# Patient Record
Sex: FEMALE | Race: WHITE | NOT HISPANIC OR LATINO | Employment: UNEMPLOYED | URBAN - METROPOLITAN AREA
[De-identification: names, ages, dates, MRNs, and addresses within clinical notes are randomized per-mention and may not be internally consistent; named-entity substitution may affect disease eponyms.]

---

## 2023-03-22 LAB
ANION GAP IN SER/PLAS: 13 MMOL/L (ref 10–20)
CALCIUM (MG/DL) IN SER/PLAS: 9.7 MG/DL (ref 8.6–10.3)
CARBON DIOXIDE, TOTAL (MMOL/L) IN SER/PLAS: 32 MMOL/L (ref 21–32)
CHLORIDE (MMOL/L) IN SER/PLAS: 99 MMOL/L (ref 98–107)
CREATININE (MG/DL) IN SER/PLAS: 0.8 MG/DL (ref 0.5–1.05)
ERYTHROCYTE DISTRIBUTION WIDTH (RATIO) BY AUTOMATED COUNT: 13.5 % (ref 11.5–14.5)
ERYTHROCYTE MEAN CORPUSCULAR HEMOGLOBIN CONCENTRATION (G/DL) BY AUTOMATED: 31.1 G/DL (ref 32–36)
ERYTHROCYTE MEAN CORPUSCULAR VOLUME (FL) BY AUTOMATED COUNT: 81 FL (ref 80–100)
ERYTHROCYTES (10*6/UL) IN BLOOD BY AUTOMATED COUNT: 5.26 X10E12/L (ref 4–5.2)
GFR FEMALE: 87 ML/MIN/1.73M2
GLUCOSE (MG/DL) IN SER/PLAS: 90 MG/DL (ref 74–99)
HEMATOCRIT (%) IN BLOOD BY AUTOMATED COUNT: 42.8 % (ref 36–46)
HEMOGLOBIN (G/DL) IN BLOOD: 13.3 G/DL (ref 12–16)
INR IN PPP BY COAGULATION ASSAY: 0.9 (ref 0.9–1.1)
LEUKOCYTES (10*3/UL) IN BLOOD BY AUTOMATED COUNT: 5.1 X10E9/L (ref 4.4–11.3)
PLATELETS (10*3/UL) IN BLOOD AUTOMATED COUNT: 332 X10E9/L (ref 150–450)
POTASSIUM (MMOL/L) IN SER/PLAS: 4.3 MMOL/L (ref 3.5–5.3)
PROTHROMBIN TIME (PT) IN PPP BY COAGULATION ASSAY: 10.5 SEC (ref 9.8–13.4)
SODIUM (MMOL/L) IN SER/PLAS: 140 MMOL/L (ref 136–145)
THYROTROPIN (MIU/L) IN SER/PLAS BY DETECTION LIMIT <= 0.05 MIU/L: 1.59 MIU/L (ref 0.44–3.98)
UREA NITROGEN (MG/DL) IN SER/PLAS: 10 MG/DL (ref 6–23)

## 2023-03-27 ENCOUNTER — HOSPITAL ENCOUNTER (OUTPATIENT)
Dept: DATA CONVERSION | Facility: HOSPITAL | Age: 58
End: 2023-03-27
Attending: INTERNAL MEDICINE | Admitting: INTERNAL MEDICINE
Payer: COMMERCIAL

## 2023-03-27 DIAGNOSIS — Z86.79 PERSONAL HISTORY OF OTHER DISEASES OF THE CIRCULATORY SYSTEM: ICD-10-CM

## 2023-03-27 DIAGNOSIS — R00.2 PALPITATIONS: ICD-10-CM

## 2023-03-28 LAB
ATRIAL RATE: 51 BPM
P AXIS: 61 DEGREES
P OFFSET: 205 MS
P ONSET: 148 MS
PR INTERVAL: 152 MS
Q ONSET: 224 MS
QRS COUNT: 9 BEATS
QRS DURATION: 96 MS
QT INTERVAL: 418 MS
QTC CALCULATION(BAZETT): 385 MS
QTC FREDERICIA: 396 MS
R AXIS: 1 DEGREES
T AXIS: -10 DEGREES
T OFFSET: 433 MS
VENTRICULAR RATE: 51 BPM

## 2023-09-07 VITALS — WEIGHT: 132.28 LBS | BODY MASS INDEX: 24.34 KG/M2 | HEIGHT: 62 IN

## 2023-10-26 ENCOUNTER — HOSPITAL ENCOUNTER (OUTPATIENT)
Dept: CARDIOLOGY | Facility: HOSPITAL | Age: 58
Discharge: HOME | End: 2023-10-26
Payer: COMMERCIAL

## 2023-10-26 DIAGNOSIS — R00.2 PALPITATIONS: ICD-10-CM

## 2023-10-26 DIAGNOSIS — Z95.818 PRESENCE OF OTHER CARDIAC IMPLANTS AND GRAFTS: Primary | ICD-10-CM

## 2023-10-26 DIAGNOSIS — Z95.818 PRESENCE OF OTHER CARDIAC IMPLANTS AND GRAFTS: ICD-10-CM

## 2023-10-26 PROCEDURE — 93298 REM INTERROG DEV EVAL SCRMS: CPT | Performed by: INTERNAL MEDICINE

## 2023-11-15 ENCOUNTER — OFFICE VISIT (OUTPATIENT)
Dept: CARDIOLOGY | Facility: CLINIC | Age: 58
End: 2023-11-15
Payer: COMMERCIAL

## 2023-11-15 ENCOUNTER — HOSPITAL ENCOUNTER (OUTPATIENT)
Dept: CARDIOLOGY | Facility: HOSPITAL | Age: 58
Discharge: HOME | End: 2023-11-15
Payer: COMMERCIAL

## 2023-11-15 VITALS
WEIGHT: 134 LBS | DIASTOLIC BLOOD PRESSURE: 62 MMHG | HEART RATE: 57 BPM | SYSTOLIC BLOOD PRESSURE: 118 MMHG | HEIGHT: 62 IN | BODY MASS INDEX: 24.66 KG/M2

## 2023-11-15 DIAGNOSIS — Z95.818 IMPLANTABLE LOOP RECORDER PRESENT: Primary | ICD-10-CM

## 2023-11-15 DIAGNOSIS — R00.2 PALPITATIONS: ICD-10-CM

## 2023-11-15 DIAGNOSIS — Z95.818 PRESENCE OF OTHER CARDIAC IMPLANTS AND GRAFTS: ICD-10-CM

## 2023-11-15 PROCEDURE — 1036F TOBACCO NON-USER: CPT | Performed by: NURSE PRACTITIONER

## 2023-11-15 PROCEDURE — 93291 INTERROG DEV EVAL SCRMS IP: CPT | Performed by: INTERNAL MEDICINE

## 2023-11-15 PROCEDURE — 93291 INTERROG DEV EVAL SCRMS IP: CPT

## 2023-11-15 PROCEDURE — 99213 OFFICE O/P EST LOW 20 MIN: CPT | Performed by: NURSE PRACTITIONER

## 2023-11-15 RX ORDER — CALCIUM CARBONATE 300MG(750)
400 TABLET,CHEWABLE ORAL DAILY
COMMUNITY

## 2023-11-15 NOTE — PROGRESS NOTES
"dCARDIOLOGY OFFICE VISIT      CHIEF COMPLAINT  Chief Complaint   Patient presents with    Device Check     Chief complaint: \"My hearts been doing fine.\"  HISTORY OF PRESENT ILLNESS  HPI  The patient is a 57-year-old  female who presents to the office today for follow-up evaluation of palpitations.  She underwent implantation of a loop recorder on March 27, 2023.  She reports that she has pressed the activated button a couple of times since last office visit.  She notes that at times her heart rate is slightly faster.  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  Patient did undergo a liver biopsy for evaluation of fatty liver with history of liver cysts.  During that procedure the common bile duct was punctured and the patient experienced bile leak into the peritoneum.  Patient was transported to [facility] and underwent repair.  Patient states that she has recovered from that procedure.  She denies chest pain, dizziness, lightheadedness, shortness of breath, abdominal distention, or lower extremity edema.  Past Medical History  Past Medical History:   Diagnosis Date    Encounter for full-term uncomplicated delivery     Normal vaginal delivery    Other conditions influencing health status     Menstruation       Social History  Social History     Tobacco Use    Smoking status: Never    Smokeless tobacco: Never   Substance Use Topics    Alcohol use: Not Currently    Drug use: Not Currently       Family History     Family History   Problem Relation Name Age of Onset    Hypertension Mother      Other (cardiac disorder) Mother      Heart failure Mother      Other (Other) Father          Allergies:  Allergies   Allergen Reactions    Red Dye Shortness of breath     Other Reaction(s): breathing    Aspartame Headache, Unknown and Other     Other Reaction(s): Intolerance    " "Cetirizine Unknown and Other     Other Reaction(s): Intolerance      \"makes heart race\"    \"makes heart race\"   Other Reaction(s): Intolerance      \"makes heart race\"    \"makes heart race\"    Gluten Protein Unknown and Other     Other Reaction(s): Intolerance    Milk Containing Products (Dairy) Unknown    Tetracyclines Unknown    Acetaminophen Rash and Unknown     rash    Cranberry Rash    Gadolinium-Containing Contrast Media Rash        Outpatient Medications:  Current Outpatient Medications   Medication Instructions    magnesium oxide (MAG-OX) 400 mg, oral, Daily          REVIEW OF SYSTEMS  Review of Systems   All other systems reviewed and are negative.        VITALS  Vitals:    11/15/23 0904   BP: 118/62   Pulse: 57       PHYSICAL EXAM  Vitals and nursing note reviewed.   Constitutional:       Appearance: Normal appearance.   HENT:      Head: Normocephalic.   Neck:      Vascular: No JVD.   Cardiovascular:      Rate and Rhythm: Normal rate and regular rhythm.      Pulses: Normal pulses.      Heart sounds: Normal heart sounds.   Pulmonary:      Effort: Pulmonary effort is normal.      Breath sounds: Normal breath sounds.   Abdominal:      General: Bowel sounds are normal.      Palpations: Abdomen is soft.   Musculoskeletal:         General: Normal range of motion.      Cervical back: Normal range of motion.   Skin:     General: Skin is warm and dry.  Left parasternal loop recorder pocket is well-healed without redness swelling or drainage.  Neurological:      General: No focal deficit present.      Mental Status: She is alert and oriented to person, place, and time.      Motor: Motor function is intact.   Psychiatric:         Attention and Perception: Attention and perception normal.         Mood and Affect: Mood and affect normal.         Speech: Speech normal.         Behavior: Behavior normal. Behavior is cooperative.         Thought Content: Thought content normal.         Cognition and Memory: Cognition and " memory normal.     Labs and testing: Twelve-lead EKG reveals sinus bradycardia 57 bpm.  QRS durations 92 ms,  ms, QTc 381 ms.  No acute ischemic changes or infarct patterns are noted.  Loop recorder interrogation dated November 15, 2023 revealed no arrhythmic events with no evidence of tachycardia, bradycardia, sinus pauses, AT/AF, or patient initiated symptoms since the last remote dated October 26, 2023.      ASSESSMENT AND PLAN    Clinical impressions:  1.  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  2.  Loop recorder implant ([employer] Linq 2) on March 27, 2023 for correlation of arrhythmia to symptom.  3.  Rheumatoid arthritis.  4.  Systemic lupus.  5.  Fatty liver disease with history of cysts status post bile leak following liver biopsy requiring repair in July, 2023.    Recommendations:  1.  Lifestyle modifications were reviewed with the patient.  She was encouraged to continue increased water intake at 64 ounces per day and refrain from caffeine consumption.  Patient reports that she does not consume any forms of caffeine.  She also reports that she eats clean, preparing most of her meals from scratch and eating organic.  2.  Obtain loop recorder downloads as scheduled.  3.  Follow-up in office with Dr. Helm in 6 months or sooner if needed.  The patient will undergo an in clinic loop recorder check prior to that office visit.    Evaluation and note by Mercedse Griffiths CNP  **Please excuse any errors in grammar or translation related to this dictation.  Voice recognition software was utilized to prepare this document.**

## 2023-12-01 ENCOUNTER — HOSPITAL ENCOUNTER (OUTPATIENT)
Dept: CARDIOLOGY | Facility: HOSPITAL | Age: 58
Discharge: HOME | End: 2023-12-01
Payer: COMMERCIAL

## 2023-12-01 DIAGNOSIS — Z95.818 PRESENCE OF OTHER CARDIAC IMPLANTS AND GRAFTS: ICD-10-CM

## 2023-12-01 DIAGNOSIS — R00.2 PALPITATIONS: ICD-10-CM

## 2023-12-01 PROCEDURE — 93298 REM INTERROG DEV EVAL SCRMS: CPT | Performed by: INTERNAL MEDICINE

## 2024-01-05 ENCOUNTER — HOSPITAL ENCOUNTER (OUTPATIENT)
Dept: CARDIOLOGY | Facility: HOSPITAL | Age: 59
Discharge: HOME | End: 2024-01-05
Payer: COMMERCIAL

## 2024-01-05 DIAGNOSIS — Z95.818 PRESENCE OF OTHER CARDIAC IMPLANTS AND GRAFTS: ICD-10-CM

## 2024-01-05 DIAGNOSIS — R00.2 PALPITATIONS: ICD-10-CM

## 2024-01-05 PROCEDURE — 93298 REM INTERROG DEV EVAL SCRMS: CPT | Performed by: INTERNAL MEDICINE

## 2024-01-05 PROCEDURE — 93297 REM INTERROG DEV EVAL ICPMS: CPT

## 2024-02-09 ENCOUNTER — HOSPITAL ENCOUNTER (OUTPATIENT)
Dept: CARDIOLOGY | Facility: HOSPITAL | Age: 59
Discharge: HOME | End: 2024-02-09
Payer: COMMERCIAL

## 2024-02-09 DIAGNOSIS — Z95.818 PRESENCE OF OTHER CARDIAC IMPLANTS AND GRAFTS: ICD-10-CM

## 2024-02-09 DIAGNOSIS — R00.2 PALPITATIONS: ICD-10-CM

## 2024-02-09 PROCEDURE — 93298 REM INTERROG DEV EVAL SCRMS: CPT | Performed by: INTERNAL MEDICINE

## 2024-02-09 PROCEDURE — 93297 REM INTERROG DEV EVAL ICPMS: CPT

## 2024-03-15 ENCOUNTER — HOSPITAL ENCOUNTER (OUTPATIENT)
Dept: CARDIOLOGY | Facility: HOSPITAL | Age: 59
Discharge: HOME | End: 2024-03-15
Payer: COMMERCIAL

## 2024-03-15 DIAGNOSIS — Z95.818 PRESENCE OF OTHER CARDIAC IMPLANTS AND GRAFTS: ICD-10-CM

## 2024-03-15 DIAGNOSIS — R00.2 PALPITATIONS: ICD-10-CM

## 2024-03-15 PROCEDURE — 93298 REM INTERROG DEV EVAL SCRMS: CPT | Performed by: INTERNAL MEDICINE

## 2024-03-15 PROCEDURE — 93298 REM INTERROG DEV EVAL SCRMS: CPT

## 2024-04-19 ENCOUNTER — HOSPITAL ENCOUNTER (OUTPATIENT)
Dept: CARDIOLOGY | Facility: HOSPITAL | Age: 59
Discharge: HOME | End: 2024-04-19
Payer: COMMERCIAL

## 2024-04-19 DIAGNOSIS — R00.2 PALPITATIONS: ICD-10-CM

## 2024-04-19 DIAGNOSIS — Z95.818 PRESENCE OF OTHER CARDIAC IMPLANTS AND GRAFTS: ICD-10-CM

## 2024-04-19 PROCEDURE — 93298 REM INTERROG DEV EVAL SCRMS: CPT | Performed by: INTERNAL MEDICINE

## 2024-04-19 PROCEDURE — 93298 REM INTERROG DEV EVAL SCRMS: CPT

## 2024-05-08 ENCOUNTER — HOSPITAL ENCOUNTER (OUTPATIENT)
Dept: CARDIOLOGY | Facility: HOSPITAL | Age: 59
Discharge: HOME | End: 2024-05-08
Payer: COMMERCIAL

## 2024-05-08 ENCOUNTER — OFFICE VISIT (OUTPATIENT)
Dept: CARDIOLOGY | Facility: CLINIC | Age: 59
End: 2024-05-08
Payer: COMMERCIAL

## 2024-05-08 VITALS
HEART RATE: 56 BPM | BODY MASS INDEX: 26.13 KG/M2 | SYSTOLIC BLOOD PRESSURE: 100 MMHG | HEIGHT: 62 IN | DIASTOLIC BLOOD PRESSURE: 52 MMHG | WEIGHT: 142 LBS

## 2024-05-08 DIAGNOSIS — Z95.818 IMPLANTABLE LOOP RECORDER PRESENT: Primary | ICD-10-CM

## 2024-05-08 DIAGNOSIS — R00.2 PALPITATIONS: ICD-10-CM

## 2024-05-08 DIAGNOSIS — I47.10 PAROXYSMAL SUPRAVENTRICULAR TACHYCARDIA (CMS-HCC): ICD-10-CM

## 2024-05-08 DIAGNOSIS — Z95.818 IMPLANTABLE LOOP RECORDER PRESENT: ICD-10-CM

## 2024-05-08 PROBLEM — L93.0 LUPUS ERYTHEMATOSUS: Status: ACTIVE | Noted: 2024-05-08

## 2024-05-08 PROCEDURE — 93291 INTERROG DEV EVAL SCRMS IP: CPT

## 2024-05-08 PROCEDURE — 1036F TOBACCO NON-USER: CPT | Performed by: NURSE PRACTITIONER

## 2024-05-08 PROCEDURE — 93291 INTERROG DEV EVAL SCRMS IP: CPT | Performed by: INTERNAL MEDICINE

## 2024-05-08 PROCEDURE — 99213 OFFICE O/P EST LOW 20 MIN: CPT | Performed by: NURSE PRACTITIONER

## 2024-05-08 NOTE — PROGRESS NOTES
"CARDIOLOGY OFFICE VISIT      CHIEF COMPLAINT  Chief Complaint   Patient presents with    Rapid Heart Rate     Chief complaint: \"I am doing fine.\"  HISTORY OF PRESENT ILLNESS  HPI  History: The patient is a 58-year-old  female who is followed for tachyarrhythmic palpitations.  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  She underwent implantation of a loop recorder on March 27, 2023 for correlation of arrhythmia to symptom and presents the office today for follow-up evaluation.  She states that she is actually been doing fine.  She underwent a liver biopsy for evaluation of fatty liver with history of liver cyst.  During that procedure the common bile duct was punctured and the patient experienced bile leak into the peritoneum.  She underwent repair at [facility].  She underwent a repeat ERCP on November 8, 2023 and states the bile leak was resolved.  She states she has been doing fine.  She denies any episodes of palpitations throughout her ordeal with her GI issues.  She denies chest pain, palpitations, dizziness, lightheadedness, shortness of breath, abdominal distention, or lower extremity edema.  Past Medical History  Past Medical History:   Diagnosis Date    Encounter for full-term uncomplicated delivery ([facility])     Normal vaginal delivery    Other conditions influencing health status     Menstruation       Social History  Social History     Tobacco Use    Smoking status: Never    Smokeless tobacco: Never   Substance Use Topics    Alcohol use: Not Currently    Drug use: Not Currently       Family History     Family History   Problem Relation Name Age of Onset    Hypertension Mother      Other (cardiac disorder) Mother      Heart failure Mother      Other (Other) Father          Allergies:  Allergies   Allergen Reactions    Red Dye Shortness of breath     Other " "Reaction(s): breathing    Aspartame Headache, Unknown and Other     Other Reaction(s): Intolerance    Cetirizine Unknown and Other     Other Reaction(s): Intolerance      \"makes heart race\"    \"makes heart race\"   Other Reaction(s): Intolerance      \"makes heart race\"    \"makes heart race\"    Gluten Protein Unknown and Other     Other Reaction(s): Intolerance    Milk Containing Products (Dairy) Unknown    Tetracyclines Unknown    Acetaminophen Rash and Unknown     rash    Cranberry Rash    Gadolinium-Containing Contrast Media Rash        Outpatient Medications:  Current Outpatient Medications   Medication Instructions    magnesium oxide (MAG-OX) 400 mg, oral, Daily          REVIEW OF SYSTEMS  Review of Systems   All other systems reviewed and are negative.        VITALS  Vitals:    05/08/24 0930   BP: 100/52   Pulse: 56       PHYSICAL EXAM  Vitals and nursing note reviewed.   Constitutional:       Appearance: Normal appearance.   HENT:      Head: Normocephalic.   Neck:      Vascular: No JVD.   Cardiovascular:      Rate and Rhythm: Normal rate and regular rhythm.      Pulses: Normal pulses.      Heart sounds: Normal heart sounds.   Pulmonary:      Effort: Pulmonary effort is normal.      Breath sounds: Normal breath sounds.   Abdominal:      General: Bowel sounds are normal.      Palpations: Abdomen is soft.   Musculoskeletal:         General: Normal range of motion.      Cervical back: Normal range of motion.   Skin:     General: Skin is warm and dry.  Left parasternal loop recorder pocket is well-healed without redness swelling or drainage.  Neurological:      General: No focal deficit present.      Mental Status: She is alert and oriented to person, place, and time.      Motor: Motor function is intact.   Psychiatric:         Attention and Perception: Attention and perception normal.         Mood and Affect: Mood and affect normal.         Speech: Speech normal.         Behavior: Behavior normal. Behavior is " cooperative.         Thought Content: Thought content normal.         Cognition and Memory: Cognition and memory normal.     Labs and testing: Twelve-lead EKG reveals sinus bradycardia 56 bpm.  QRS durations 86 ms,  ms, QTc 409 ms.  No acute ischemic changes or infarct patterns are noted.  Loop recorder interrogation dated May 8, 2024 reveals no arrhythmic events.  Estimated battery longevity is good.      ASSESSMENT AND PLAN    Clinical impressions:  1.  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  2.  Loop recorder implant ([employer] Linq 2) on March 27, 2023 for correlation of arrhythmia to symptom.  3.  Rheumatoid arthritis.  4.  Systemic lupus.  5.  Fatty liver disease with history of cysts status post bile leak following liver biopsy requiring repair in July, 2023..    Recommendations:  1.  Continue magnesium oxide as prescribed.  2.  Obtain remote loop recorder checks as scheduled.  The patient will undergo an in clinic loop recorder check prior to the office visit with Dr. Helm.  3.  Follow-up in office with Dr. Helm in 6 months or sooner if needed.  4.  Continue lifestyle modifications as discussed.    Evaluation and note by Mercedes Griffiths CNP  **Please excuse any errors in grammar or translation related to this dictation.  Voice recognition software was utilized to prepare this document.**

## 2024-05-15 ENCOUNTER — APPOINTMENT (OUTPATIENT)
Dept: CARDIOLOGY | Facility: CLINIC | Age: 59
End: 2024-05-15
Payer: COMMERCIAL

## 2024-05-15 ENCOUNTER — APPOINTMENT (OUTPATIENT)
Dept: CARDIOLOGY | Facility: HOSPITAL | Age: 59
End: 2024-05-15
Payer: COMMERCIAL

## 2024-06-07 ENCOUNTER — HOSPITAL ENCOUNTER (OUTPATIENT)
Dept: CARDIOLOGY | Facility: HOSPITAL | Age: 59
Discharge: HOME | End: 2024-06-07
Payer: COMMERCIAL

## 2024-06-07 DIAGNOSIS — R00.2 PALPITATIONS: ICD-10-CM

## 2024-06-07 DIAGNOSIS — Z95.818 PRESENCE OF OTHER CARDIAC IMPLANTS AND GRAFTS: ICD-10-CM

## 2024-06-13 ENCOUNTER — HOSPITAL ENCOUNTER (OUTPATIENT)
Dept: CARDIOLOGY | Facility: HOSPITAL | Age: 59
Discharge: HOME | End: 2024-06-13
Payer: COMMERCIAL

## 2024-06-13 DIAGNOSIS — R00.2 PALPITATIONS: ICD-10-CM

## 2024-06-13 DIAGNOSIS — Z95.818 PRESENCE OF OTHER CARDIAC IMPLANTS AND GRAFTS: ICD-10-CM

## 2024-06-13 PROCEDURE — 93298 REM INTERROG DEV EVAL SCRMS: CPT | Performed by: INTERNAL MEDICINE

## 2024-06-13 PROCEDURE — 93298 REM INTERROG DEV EVAL SCRMS: CPT

## 2024-07-19 ENCOUNTER — HOSPITAL ENCOUNTER (OUTPATIENT)
Dept: CARDIOLOGY | Facility: HOSPITAL | Age: 59
Discharge: HOME | End: 2024-07-19
Payer: COMMERCIAL

## 2024-07-19 DIAGNOSIS — Z95.818 PRESENCE OF OTHER CARDIAC IMPLANTS AND GRAFTS: ICD-10-CM

## 2024-07-19 DIAGNOSIS — R00.2 PALPITATIONS: ICD-10-CM

## 2024-07-19 PROCEDURE — 93298 REM INTERROG DEV EVAL SCRMS: CPT

## 2024-08-02 ENCOUNTER — HOSPITAL ENCOUNTER (OUTPATIENT)
Dept: CARDIOLOGY | Facility: HOSPITAL | Age: 59
Discharge: HOME | End: 2024-08-02
Payer: COMMERCIAL

## 2024-08-02 DIAGNOSIS — Z95.818 PRESENCE OF OTHER CARDIAC IMPLANTS AND GRAFTS: ICD-10-CM

## 2024-08-02 DIAGNOSIS — R00.2 PALPITATIONS: ICD-10-CM

## 2024-08-16 ENCOUNTER — HOSPITAL ENCOUNTER (OUTPATIENT)
Dept: CARDIOLOGY | Facility: HOSPITAL | Age: 59
Discharge: HOME | End: 2024-08-16
Payer: COMMERCIAL

## 2024-08-16 DIAGNOSIS — Z95.818 PRESENCE OF OTHER CARDIAC IMPLANTS AND GRAFTS: ICD-10-CM

## 2024-08-16 DIAGNOSIS — R00.2 PALPITATIONS: ICD-10-CM

## 2024-08-23 ENCOUNTER — HOSPITAL ENCOUNTER (OUTPATIENT)
Dept: CARDIOLOGY | Facility: HOSPITAL | Age: 59
Discharge: HOME | End: 2024-08-23
Payer: COMMERCIAL

## 2024-08-23 DIAGNOSIS — R00.2 PALPITATIONS: ICD-10-CM

## 2024-08-23 DIAGNOSIS — Z95.818 PRESENCE OF OTHER CARDIAC IMPLANTS AND GRAFTS: ICD-10-CM

## 2024-08-23 PROCEDURE — 93298 REM INTERROG DEV EVAL SCRMS: CPT

## 2024-09-27 ENCOUNTER — HOSPITAL ENCOUNTER (OUTPATIENT)
Dept: CARDIOLOGY | Facility: HOSPITAL | Age: 59
Discharge: HOME | End: 2024-09-27
Payer: COMMERCIAL

## 2024-09-27 DIAGNOSIS — R00.2 PALPITATIONS: ICD-10-CM

## 2024-09-27 DIAGNOSIS — Z95.818 PRESENCE OF OTHER CARDIAC IMPLANTS AND GRAFTS: ICD-10-CM

## 2024-09-27 PROCEDURE — 93298 REM INTERROG DEV EVAL SCRMS: CPT

## 2024-09-27 PROCEDURE — 93298 REM INTERROG DEV EVAL SCRMS: CPT | Performed by: INTERNAL MEDICINE

## 2024-11-01 ENCOUNTER — HOSPITAL ENCOUNTER (OUTPATIENT)
Dept: CARDIOLOGY | Facility: HOSPITAL | Age: 59
Discharge: HOME | End: 2024-11-01
Payer: COMMERCIAL

## 2024-11-01 DIAGNOSIS — R00.2 PALPITATIONS: ICD-10-CM

## 2024-11-01 DIAGNOSIS — Z95.818 PRESENCE OF OTHER CARDIAC IMPLANTS AND GRAFTS: ICD-10-CM

## 2024-11-01 DIAGNOSIS — Z95.818 PRESENCE OF OTHER CARDIAC IMPLANTS AND GRAFTS: Primary | ICD-10-CM

## 2024-11-01 PROCEDURE — 93298 REM INTERROG DEV EVAL SCRMS: CPT | Performed by: INTERNAL MEDICINE

## 2024-11-01 PROCEDURE — 93298 REM INTERROG DEV EVAL SCRMS: CPT

## 2024-11-27 ENCOUNTER — APPOINTMENT (OUTPATIENT)
Dept: CARDIOLOGY | Facility: CLINIC | Age: 59
End: 2024-11-27
Payer: COMMERCIAL

## 2024-11-27 ENCOUNTER — HOSPITAL ENCOUNTER (OUTPATIENT)
Dept: CARDIOLOGY | Facility: HOSPITAL | Age: 59
Discharge: HOME | End: 2024-11-27
Payer: COMMERCIAL

## 2024-11-27 VITALS
BODY MASS INDEX: 27.79 KG/M2 | WEIGHT: 151 LBS | HEIGHT: 62 IN | HEART RATE: 60 BPM | DIASTOLIC BLOOD PRESSURE: 80 MMHG | SYSTOLIC BLOOD PRESSURE: 110 MMHG

## 2024-11-27 DIAGNOSIS — R94.31 ABNORMAL EKG: ICD-10-CM

## 2024-11-27 DIAGNOSIS — Z95.818 IMPLANTABLE LOOP RECORDER PRESENT: Primary | ICD-10-CM

## 2024-11-27 DIAGNOSIS — R00.2 PALPITATIONS: ICD-10-CM

## 2024-11-27 DIAGNOSIS — Z95.818 IMPLANTABLE LOOP RECORDER PRESENT: ICD-10-CM

## 2024-11-27 DIAGNOSIS — I47.10 PAROXYSMAL SUPRAVENTRICULAR TACHYCARDIA (CMS-HCC): ICD-10-CM

## 2024-11-27 DIAGNOSIS — Z78.9 NEVER SMOKED TOBACCO: ICD-10-CM

## 2024-11-27 PROCEDURE — 99215 OFFICE O/P EST HI 40 MIN: CPT | Performed by: INTERNAL MEDICINE

## 2024-11-27 PROCEDURE — 93291 INTERROG DEV EVAL SCRMS IP: CPT

## 2024-11-27 PROCEDURE — 1036F TOBACCO NON-USER: CPT | Performed by: INTERNAL MEDICINE

## 2024-11-27 PROCEDURE — 93291 INTERROG DEV EVAL SCRMS IP: CPT | Performed by: INTERNAL MEDICINE

## 2024-11-27 PROCEDURE — 3008F BODY MASS INDEX DOCD: CPT | Performed by: INTERNAL MEDICINE

## 2024-11-27 PROCEDURE — 93000 ELECTROCARDIOGRAM COMPLETE: CPT | Mod: DISTINCT PROCEDURAL SERVICE | Performed by: INTERNAL MEDICINE

## 2024-11-27 ASSESSMENT — ENCOUNTER SYMPTOMS
CONSTITUTIONAL NEGATIVE: 1
CARDIOVASCULAR NEGATIVE: 1
NEUROLOGICAL NEGATIVE: 1
RESPIRATORY NEGATIVE: 1

## 2024-11-27 NOTE — PROGRESS NOTES
CARDIOLOGY OFFICE VISIT      CHIEF COMPLAINT  Chief Complaint   Patient presents with    Follow-up     Pt is here today following up after 6 months with device check        HISTORY OF PRESENT ILLNESS  HPI  58-year-old  female who is followed for tachyarrhythmic palpitations. 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 She underwent implantation of a loop recorder on March 27, 2023 for correlation of arrhythmia to symptom and presents the office today for follow-up evaluation. She states that she is actually been doing fine. She underwent a liver biopsy for evaluation of fatty liver with history of liver cyst. During that procedure the common bile duct was punctured and the patient experienced bile leak into the peritoneum. She underwent repair at [facility]. She underwent a repeat ERCP on November 8, 2023 and states the bile leak was resolved.    Patient states lately she has been doing well.  She recalls having some episodes of palpitations around December 2024.  Loop recorder interrogation was reviewed during this office visit.  Patient had a couple of episodes of palpitations and was first on August 9, 2024.  Looking at the rhythm strips that were consistent with brief episodes of SVT possibility of AVNRT.  Only short duration.    EKG performed today shows sinus rhythm at rate of 60 bpm QRS duration 90 ms QT corrected 400 ms.  Rhythm strip shows the same pattern.      Past Medical History  Past Medical History:   Diagnosis Date    Encounter for full-term uncomplicated delivery     Normal vaginal delivery    Other conditions influencing health status     Menstruation       Social History  Social History     Tobacco Use    Smoking status: Never    Smokeless tobacco: Never   Substance Use Topics    Alcohol use: Not Currently    Drug use: Not Currently       Family History    "  Family History   Problem Relation Name Age of Onset    Hypertension Mother      Other (cardiac disorder) Mother      Heart failure Mother      Other (Other) Father          Allergies:  Allergies   Allergen Reactions    Red Dye Shortness of breath     Other Reaction(s): breathing    Aspartame Headache, Unknown and Other     Other Reaction(s): Intolerance    Cetirizine Unknown and Other     Other Reaction(s): Intolerance      \"makes heart race\"    \"makes heart race\"   Other Reaction(s): Intolerance      \"makes heart race\"    \"makes heart race\"    Gluten Protein Unknown and Other     Other Reaction(s): Intolerance    Milk Containing Products (Dairy) Unknown    Tetracyclines Unknown    Acetaminophen Rash and Unknown     rash    Cranberry Rash    Gadolinium-Containing Contrast Media Rash        Outpatient Medications:  Current Outpatient Medications   Medication Instructions    magnesium oxide (MAG-OX) 400 mg, Daily          REVIEW OF SYSTEMS  Review of Systems   Constitutional: Negative.   Cardiovascular: Negative.    Respiratory: Negative.     Neurological: Negative.    All other systems reviewed and are negative.        VITALS  Vitals:    11/27/24 0943   BP: 110/80   Pulse: 60       PHYSICAL EXAM  Constitutional:       Appearance: Healthy appearance. Not in distress.   Eyes:      Conjunctiva/sclera: Conjunctivae normal.      Pupils: Pupils are equal, round, and reactive to light.   Neck:      Vascular: No JVR. JVD normal.   Pulmonary:      Effort: Pulmonary effort is normal.      Breath sounds: Normal breath sounds. No wheezing. No rhonchi. No rales.   Chest:      Chest wall: Not tender to palpatation.   Cardiovascular:      PMI at left midclavicular line. Normal rate. Regular rhythm. Normal S1. Normal S2.       Murmurs: There is no murmur.      No gallop.  No click. No rub.   Pulses:     Intact distal pulses.   Edema:     Peripheral edema absent.   Abdominal:      Tenderness: There is no abdominal tenderness. "   Musculoskeletal: Normal range of motion.         General: No tenderness.      Cervical back: Normal range of motion. Skin:     General: Skin is warm and dry.   Neurological:      General: No focal deficit present.      Mental Status: Alert and oriented to person, place and time.           ASSESSMENT AND PLAN    Clinical impressions:  1.  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  2.  Loop recorder implant ([employer] Linq 2) on March 27, 2023 for correlation of arrhythmia to symptom.  3.  Rheumatoid arthritis.  4.  Systemic lupus.  5.  Fatty liver disease with history of cysts status post bile leak following liver biopsy requiring repair in July, 2023..    Plan recommendations    I had a lengthy discussion with patient regarding findings of the device interrogation.  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  We discussed the option of observation versus electrophysiology study and ablation therapy.  Patient states that she would like to be in observation for now her symptoms are not too bad.  Will follow her in my office every 6 months or sooner needed.    Follow device clinic as scheduled.    Patient prefers not to use any AV node blocking agents due to episodes of hypotension.    Risk factor modification and lifestyle modification discussed with patient. Diet , exercise and hydration discussed with patient.'    I have personally review with patient during this office visit, laboratory data, echocardiogram results, stress test results, Holter-event monitor results prior and after the last electrophysiology visit. All questions has been answered.    Please excuse any errors in grammar or translation related to this dictation.  Voice recognition software was utilized to prepare this document.

## 2024-11-27 NOTE — PATIENT INSTRUCTIONS
6 month visit  Continue same medications and treatments.   Patient educated on proper medication use.   Patient educated on risk factor modification.   Please bring any lab results from other providers / physicians to your next appointment.     Please bring all medicines, vitamins, and herbal supplements with you when you come to the office.     Prescriptions will not be filled unless you are compliant with your follow up appointments or have a follow up appointment scheduled as per instruction of your physician. Refills should be requested at the time of your visit.  Avoid caffeine  Drink plenty of water.

## 2025-01-03 ENCOUNTER — HOSPITAL ENCOUNTER (OUTPATIENT)
Dept: CARDIOLOGY | Facility: HOSPITAL | Age: 60
Discharge: HOME | End: 2025-01-03
Payer: COMMERCIAL

## 2025-01-03 DIAGNOSIS — Z95.818 IMPLANTABLE LOOP RECORDER PRESENT: ICD-10-CM

## 2025-01-03 DIAGNOSIS — R00.2 PALPITATIONS: ICD-10-CM

## 2025-01-03 DIAGNOSIS — I47.10 PAROXYSMAL SUPRAVENTRICULAR TACHYCARDIA (CMS-HCC): ICD-10-CM

## 2025-01-03 PROCEDURE — 93298 REM INTERROG DEV EVAL SCRMS: CPT | Performed by: INTERNAL MEDICINE

## 2025-01-03 PROCEDURE — 93298 REM INTERROG DEV EVAL SCRMS: CPT

## 2025-02-07 ENCOUNTER — HOSPITAL ENCOUNTER (OUTPATIENT)
Dept: CARDIOLOGY | Facility: HOSPITAL | Age: 60
Discharge: HOME | End: 2025-02-07
Payer: COMMERCIAL

## 2025-02-07 DIAGNOSIS — I47.10 PAROXYSMAL SUPRAVENTRICULAR TACHYCARDIA (CMS-HCC): ICD-10-CM

## 2025-02-07 DIAGNOSIS — Z95.818 IMPLANTABLE LOOP RECORDER PRESENT: ICD-10-CM

## 2025-02-07 DIAGNOSIS — R00.2 PALPITATIONS: ICD-10-CM

## 2025-02-07 PROCEDURE — 93298 REM INTERROG DEV EVAL SCRMS: CPT

## 2025-02-07 PROCEDURE — 93298 REM INTERROG DEV EVAL SCRMS: CPT | Performed by: INTERNAL MEDICINE

## 2025-02-17 ENCOUNTER — HOSPITAL ENCOUNTER (OUTPATIENT)
Dept: CARDIOLOGY | Facility: HOSPITAL | Age: 60
Discharge: HOME | End: 2025-02-17
Payer: COMMERCIAL

## 2025-02-17 DIAGNOSIS — I47.10 PAROXYSMAL SUPRAVENTRICULAR TACHYCARDIA (CMS-HCC): ICD-10-CM

## 2025-02-17 DIAGNOSIS — R00.2 PALPITATIONS: ICD-10-CM

## 2025-02-17 DIAGNOSIS — Z95.818 IMPLANTABLE LOOP RECORDER PRESENT: ICD-10-CM

## 2025-03-18 ENCOUNTER — HOSPITAL ENCOUNTER (OUTPATIENT)
Dept: CARDIOLOGY | Facility: HOSPITAL | Age: 60
Discharge: HOME | End: 2025-03-18
Payer: COMMERCIAL

## 2025-03-18 DIAGNOSIS — I47.10 PAROXYSMAL SUPRAVENTRICULAR TACHYCARDIA (CMS-HCC): ICD-10-CM

## 2025-03-18 DIAGNOSIS — Z95.818 IMPLANTABLE LOOP RECORDER PRESENT: ICD-10-CM

## 2025-03-18 DIAGNOSIS — R00.2 PALPITATIONS: ICD-10-CM

## 2025-03-18 PROCEDURE — 93298 REM INTERROG DEV EVAL SCRMS: CPT | Performed by: INTERNAL MEDICINE

## 2025-03-18 PROCEDURE — 93298 REM INTERROG DEV EVAL SCRMS: CPT

## 2025-03-20 ENCOUNTER — APPOINTMENT (OUTPATIENT)
Dept: CARDIOLOGY | Facility: CLINIC | Age: 60
End: 2025-03-20
Payer: COMMERCIAL

## 2025-03-20 VITALS
HEIGHT: 62 IN | BODY MASS INDEX: 28.6 KG/M2 | HEART RATE: 68 BPM | DIASTOLIC BLOOD PRESSURE: 76 MMHG | WEIGHT: 155.4 LBS | SYSTOLIC BLOOD PRESSURE: 136 MMHG

## 2025-03-20 DIAGNOSIS — Z95.818 IMPLANTABLE LOOP RECORDER PRESENT: Primary | ICD-10-CM

## 2025-03-20 DIAGNOSIS — I47.10 PAROXYSMAL SUPRAVENTRICULAR TACHYCARDIA (CMS-HCC): ICD-10-CM

## 2025-03-20 DIAGNOSIS — R00.2 PALPITATIONS: ICD-10-CM

## 2025-03-20 DIAGNOSIS — Z78.9 NEVER SMOKED TOBACCO: ICD-10-CM

## 2025-03-20 DIAGNOSIS — R94.31 ABNORMAL EKG: ICD-10-CM

## 2025-03-20 PROCEDURE — 99215 OFFICE O/P EST HI 40 MIN: CPT | Performed by: INTERNAL MEDICINE

## 2025-03-20 PROCEDURE — 1036F TOBACCO NON-USER: CPT | Performed by: INTERNAL MEDICINE

## 2025-03-20 PROCEDURE — 93000 ELECTROCARDIOGRAM COMPLETE: CPT | Performed by: INTERNAL MEDICINE

## 2025-03-20 PROCEDURE — 3008F BODY MASS INDEX DOCD: CPT | Performed by: INTERNAL MEDICINE

## 2025-03-20 ASSESSMENT — ENCOUNTER SYMPTOMS
DYSPNEA ON EXERTION: 0
PALPITATIONS: 1

## 2025-03-20 NOTE — PROGRESS NOTES
CARDIOLOGY OFFICE VISIT      CHIEF COMPLAINT  Chief Complaint   Patient presents with    Follow-up     Pt is here today following up to discuss abnormal device check        HISTORY OF PRESENT ILLNESS  HPI  59-year-old  female who is followed for tachyarrhythmic palpitations. 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 She underwent implantation of a loop recorder on March 27, 2023 for correlation of arrhythmia to symptom and presents the office today for follow-up evaluation. She states that she is actually been doing fine. She underwent a liver biopsy for evaluation of fatty liver with history of liver cyst. During that procedure the common bile duct was punctured and the patient experienced bile leak into the peritoneum. She underwent repair at [facility]. She underwent a repeat ERCP on November 8, 2023 and states the bile leak was resolved.     She recalls having some episodes of palpitations around December 2024. Loop recorder interrogation was reviewed during this office visit. Patient had a couple of episodes of palpitations and was first on August 9, 2024. Looking at the rhythm strips that were consistent with brief episodes of SVT possibility of AVNRT. Only short duration.     Patient states that she continues having episodes of palpitations.  She had 1 episode of palpitations lasting for approximately 5 minutes at a heart rate of 150 bpm by loop recorder interrogation February 13, 2025.  That happened around midnight.    EKG performed today shows sinus rhythm at a rate of 68 bpm QRS ration 96 ms QT corrected 448 ms.  Rhythm strip shows the same pattern.          Past Medical History  Past Medical History:   Diagnosis Date    Encounter for full-term uncomplicated delivery     Normal vaginal delivery    Other conditions influencing health status     Menstruation  "      Social History  Social History     Tobacco Use    Smoking status: Never    Smokeless tobacco: Never   Substance Use Topics    Alcohol use: Not Currently    Drug use: Not Currently       Family History     Family History   Problem Relation Name Age of Onset    Hypertension Mother      Other (cardiac disorder) Mother      Heart failure Mother      Other (Other) Father          Allergies:  Allergies   Allergen Reactions    Red Dye Shortness of breath     Other Reaction(s): breathing    Aspartame Headache, Unknown and Other     Other Reaction(s): Intolerance    Cetirizine Unknown and Other     Other Reaction(s): Intolerance      \"makes heart race\"    \"makes heart race\"   Other Reaction(s): Intolerance      \"makes heart race\"    \"makes heart race\"    Gluten Protein Unknown and Other     Other Reaction(s): Intolerance    Milk Containing Products (Dairy) Unknown    Tetracyclines Unknown    Acetaminophen Rash and Unknown     rash    Cranberry Rash    Gadolinium-Containing Contrast Media Rash        Outpatient Medications:  Current Outpatient Medications   Medication Instructions    magnesium oxide (MAG-OX) 400 mg, Daily          REVIEW OF SYSTEMS  Review of Systems   Cardiovascular:  Positive for palpitations. Negative for chest pain and dyspnea on exertion.   All other systems reviewed and are negative.        VITALS  Vitals:    03/20/25 1329   BP: 136/76   Pulse: 68       PHYSICAL EXAM  Constitutional:       General: Awake.      Appearance: Normal and healthy appearance. Well-developed and not in distress.   Neck:      Vascular: No JVR. JVD normal.   Pulmonary:      Effort: Pulmonary effort is normal.      Breath sounds: Normal breath sounds. No wheezing. No rhonchi. No rales.   Chest:      Chest wall: Not tender to palpatation.      Comments: Loop recorder in place   Cardiovascular:      PMI at left midclavicular line. Normal rate. Regular rhythm. Normal S1. Normal S2.       Murmurs: There is no murmur.      No " gallop.  No click. No rub.   Pulses:     Intact distal pulses.   Edema:     Peripheral edema absent.   Abdominal:      Tenderness: There is no abdominal tenderness.   Musculoskeletal: Normal range of motion.         General: No tenderness. Skin:     General: Skin is warm and dry.   Neurological:      General: No focal deficit present.      Mental Status: Alert and oriented to person, place and time.           ASSESSMENT AND PLAN      Clinical impressions:  1.  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  2.  Loop recorder implant ([employer] Linq 2) on March 27, 2023 for correlation of arrhythmia to symptom.  3.  Rheumatoid arthritis.  4.  Systemic lupus.  5.  Fatty liver disease with history of cysts status post bile leak following liver biopsy requiring repair in July, 2023..     Plan-recommendations      Discussion with patient regarding findings of the loop recorder.  Patient still have palpitation associated with SVT.  We discussed the option of ablation therapy.  She states that she would like to think about this.  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  She may have recurrence of this arrhythmia could be a different arrhythmia.    Continue with current medical therapy.    Follow device clinic as scheduled.    Follow-up in my office in the next 3 to 6 months or sooner if needed.    Risk factor modification and lifestyle modification discussed with patient. Diet , exercise and hydration discussed with patient.    I have personally review with patient during this office visit, laboratory data, echocardiogram results, stress test results, Holter-event monitor results prior and after the last electrophysiology visit. All questions has been answered.    Please excuse any errors in grammar or translation related to this dictation.  Voice  recognition software was utilized to prepare this document.        I, Dr. Helm, personally performed the services described in the documentation as scribed by the nurse in my presence, and confirm it is both accurate and complete.

## 2025-03-20 NOTE — PATIENT INSTRUCTIONS
Continue same medications/treatment.  Patient educated on proper medication use.  Patient educated on risk factor modification.  Please bring any lab results from other providers/physicians to your next appointment.    Please bring all medicines, vitamins, and herbal supplements with you when you come to the office.    Prescriptions will not be filled unless you are compliant with your follow up appointments or have a follow up appointment scheduled as per instruction of your physician. Refills should be requested at the time of your visit.    Follow up with Dr. Smith as scheduled in June with device check   Continue monthly remote checks    Jen GRANT RN, AM SCRIBING FOR, AND IN THE PRESENCE OF DR. EVAN SMITH MD

## 2025-04-25 ENCOUNTER — HOSPITAL ENCOUNTER (OUTPATIENT)
Dept: CARDIOLOGY | Facility: HOSPITAL | Age: 60
Discharge: HOME | End: 2025-04-25
Payer: COMMERCIAL

## 2025-04-25 DIAGNOSIS — R00.2 PALPITATIONS: ICD-10-CM

## 2025-04-25 DIAGNOSIS — I47.10 PAROXYSMAL SUPRAVENTRICULAR TACHYCARDIA (CMS-HCC): ICD-10-CM

## 2025-04-25 DIAGNOSIS — Z95.818 IMPLANTABLE LOOP RECORDER PRESENT: ICD-10-CM

## 2025-04-25 PROCEDURE — 93298 REM INTERROG DEV EVAL SCRMS: CPT

## 2025-04-25 PROCEDURE — 93298 REM INTERROG DEV EVAL SCRMS: CPT | Performed by: INTERNAL MEDICINE

## 2025-05-30 ENCOUNTER — HOSPITAL ENCOUNTER (OUTPATIENT)
Dept: CARDIOLOGY | Facility: HOSPITAL | Age: 60
Discharge: HOME | End: 2025-05-30
Payer: COMMERCIAL

## 2025-05-30 DIAGNOSIS — R00.2 PALPITATIONS: ICD-10-CM

## 2025-05-30 DIAGNOSIS — Z95.818 IMPLANTABLE LOOP RECORDER PRESENT: ICD-10-CM

## 2025-05-30 DIAGNOSIS — I47.10 PAROXYSMAL SUPRAVENTRICULAR TACHYCARDIA: ICD-10-CM

## 2025-05-30 PROCEDURE — 93298 REM INTERROG DEV EVAL SCRMS: CPT | Performed by: INTERNAL MEDICINE

## 2025-05-30 PROCEDURE — 93298 REM INTERROG DEV EVAL SCRMS: CPT

## 2025-06-04 ENCOUNTER — APPOINTMENT (OUTPATIENT)
Dept: CARDIOLOGY | Facility: HOSPITAL | Age: 60
End: 2025-06-04
Payer: COMMERCIAL

## 2025-06-04 ENCOUNTER — APPOINTMENT (OUTPATIENT)
Dept: CARDIOLOGY | Facility: CLINIC | Age: 60
End: 2025-06-04
Payer: COMMERCIAL

## 2025-07-03 ENCOUNTER — HOSPITAL ENCOUNTER (OUTPATIENT)
Dept: CARDIOLOGY | Facility: HOSPITAL | Age: 60
Discharge: HOME | End: 2025-07-03
Payer: COMMERCIAL

## 2025-07-03 DIAGNOSIS — I47.10 PAROXYSMAL SUPRAVENTRICULAR TACHYCARDIA: ICD-10-CM

## 2025-07-03 DIAGNOSIS — Z95.818 IMPLANTABLE LOOP RECORDER PRESENT: ICD-10-CM

## 2025-07-03 DIAGNOSIS — R00.2 PALPITATIONS: ICD-10-CM

## 2025-07-03 PROCEDURE — 93298 REM INTERROG DEV EVAL SCRMS: CPT

## 2025-07-03 PROCEDURE — 93298 REM INTERROG DEV EVAL SCRMS: CPT | Performed by: INTERNAL MEDICINE

## 2025-07-09 ENCOUNTER — HOSPITAL ENCOUNTER (OUTPATIENT)
Dept: CARDIOLOGY | Facility: HOSPITAL | Age: 60
Discharge: HOME | End: 2025-07-09
Payer: COMMERCIAL

## 2025-07-09 ENCOUNTER — APPOINTMENT (OUTPATIENT)
Dept: CARDIOLOGY | Facility: CLINIC | Age: 60
End: 2025-07-09
Payer: COMMERCIAL

## 2025-07-09 VITALS
WEIGHT: 148 LBS | BODY MASS INDEX: 27.23 KG/M2 | HEIGHT: 62 IN | DIASTOLIC BLOOD PRESSURE: 68 MMHG | HEART RATE: 65 BPM | SYSTOLIC BLOOD PRESSURE: 126 MMHG

## 2025-07-09 DIAGNOSIS — I47.10 PAROXYSMAL SUPRAVENTRICULAR TACHYCARDIA: ICD-10-CM

## 2025-07-09 DIAGNOSIS — Z95.818 IMPLANTABLE LOOP RECORDER PRESENT: ICD-10-CM

## 2025-07-09 DIAGNOSIS — R00.2 PALPITATIONS: ICD-10-CM

## 2025-07-09 PROCEDURE — 93291 INTERROG DEV EVAL SCRMS IP: CPT

## 2025-07-09 PROCEDURE — 93000 ELECTROCARDIOGRAM COMPLETE: CPT | Mod: DISTINCT PROCEDURAL SERVICE | Performed by: INTERNAL MEDICINE

## 2025-07-09 PROCEDURE — 3008F BODY MASS INDEX DOCD: CPT | Performed by: PHYSICIAN ASSISTANT

## 2025-07-09 PROCEDURE — 93291 INTERROG DEV EVAL SCRMS IP: CPT | Performed by: INTERNAL MEDICINE

## 2025-07-09 PROCEDURE — 1036F TOBACCO NON-USER: CPT | Performed by: PHYSICIAN ASSISTANT

## 2025-07-09 PROCEDURE — 99214 OFFICE O/P EST MOD 30 MIN: CPT | Performed by: PHYSICIAN ASSISTANT

## 2025-07-09 RX ORDER — DILTIAZEM HYDROCHLORIDE 30 MG/1
30 TABLET, FILM COATED ORAL 2 TIMES DAILY PRN
Qty: 30 TABLET | Refills: 3 | Status: SHIPPED | OUTPATIENT
Start: 2025-07-09 | End: 2026-07-09

## 2025-07-09 NOTE — PATIENT INSTRUCTIONS
Great to see you today.   Please take your medications and or do life style behavior modifications as discussed.   Please make appointment in 6 months with Dr Helm  You were prescribed immediate release diltiazem. You can take this if your heart rate doesn't come down after 5 mins or after vagal maneuvers.   Please call if you have any questions or concerns.  Please go to emergency department if you have abrupt onset of chest, shortness of breath, light headedness or dizziness.

## 2025-07-09 NOTE — PROGRESS NOTES
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 She will consider ablation if necessary.   Maintained on MgOx 400mg daily    HPI: 7/9/2025  59 year old female pmhx AVNRT s/p ablation (12/2010), Loop recorder implanted 3/2023  to correlate arrhythmia to symptoms/palpitations, SVT palpitations RA, SLE, Fatty liver. Maintained on MgOx 400mg daily.     She is here for loop recorder device checks.  Review of her event reports were unremarkable with the exception of summary between February 8 and March 18 that reports onset of SVT up to 150 bpm but the patient denied any dizziness or syncope.  She reports that the episode lasted about 5 minutes.  She does not take any medications other than her magnesium.    She is going to [place] this fall and since she feels well enough that she would like to hold off ablation as she had a significant recovery time.     Today's EKG Interpretation: Normal sinus rhythm, rate 65 bpm, SC interval 146 ms, QRS duration 88 ms, QTc 388 ms, right atrial enlargement, nonspecific T wave abnormality inferior leads    VITALS  Vitals:    07/09/25 0923   BP: 126/68   Pulse: 65     Wt Readings from Last 4 Encounters:   07/09/25 67.1 kg (148 lb)   03/20/25 70.5 kg (155 lb 6.4 oz)   11/27/24 68.5 kg (151 lb)   05/08/24 64.4 kg (142 lb)       PHYSICAL EXAM:  GENERAL:  Well developed, well nourished, in no acute distress.  NEURO/PSYCH:  No focal deficits. A&O x 3   LUNGS:  Clear to auscultation  bilaterally. No wheezing, rhonci, or crackles  HEART:  RRR, no M/R/G.   EXTREMITIES:  Warm with good color, no clubbing or cyanosis.  No pitting edema.     Medical History[1]  Surgical History[2]  Social History[3]  Family History[4]  RX Allergies[5]   Current Outpatient Medications   Medication Instructions    dilTIAZem (CARDIZEM) 30 mg, oral, 2 times daily PRN    magnesium oxide (MAG-OX) 400 mg, Daily      Relevant reports:   7/17/2009 ECHO  Normal LVEF, EF 55%  No significant valve disease.  Trace regurgitation of mitral valve, tricuspid valve, pulmonary valve     10/28/2021 Nuclear treadmill stress test  1. Normal adequate treadmill MPI. Negative for ischemia by electrocardiogram and myocardial perfusion imaging.   2. No arrhythmia is noted.   3. No anginal symptoms noted.   4. Normal TID of 0.82.   5. Normal left ventricular function of 68%.   6. Patient tolerated procedure well.      12/17/2010 EP study and ablation of AVNRT  Radiofrequency lesion summary: 4 applications were delivered with a total lesion time of 4 to 4 seconds  Normal SA rufus function, AV rufus function, and His-Purkinje conduction  Concentric VA conduction.  No VA conduction when pacing at 450 ms  There was a hemodynamically stable narrow complex tachycardia consistent with typical atrial ventricular rufus reentrant tachycardia.  Uneventful ablation of the slow pathway over the AV node to prevent atrioventricular rufus reentrant tachycardia.  Dual AV rufus physiology was still present at the end of the case but no evidence of AVNRT at baseline state or with isoproterenol infusion      Problem List[6]     ASSESSMENT AND PLAN  Problem List Items Addressed This Visit       Paroxysmal supraventricular tachycardia  She is going to [place] this fall and since she feels well enough that she would like to hold off ablation as she had a significant recovery time.  However she is amendable to using diltiazem 30 mg immediate release as a pill in the  "pocket just in case she is in [place] and has an episode thet last greater than 5 minutes or her vagal maneuvers do not work.  She will follow-up with Dr. Helm in 6 months    Relevant Medications    dilTIAZem (Cardizem) 30 mg immediate release tablet    Other Relevant Orders    ECG 12 lead (Clinic Performed)    Follow Up In Cardiology    Cardiac Device Check - In Clinic    Cardiac Device Check - In Clinic    Cardiac Device Check - Remote        EFRAIN Wade PA-C             [1]   Past Medical History:  Diagnosis Date    Encounter for full-term uncomplicated delivery     Normal vaginal delivery    Other conditions influencing health status     Menstruation   [2]   Past Surgical History:  Procedure Laterality Date    OTHER SURGICAL HISTORY  02/17/2020    Tubal ligation bilateral    OTHER SURGICAL HISTORY  02/17/2020    Cholecystectomy    OTHER SURGICAL HISTORY  02/17/2020    Foot surgery    OTHER SURGICAL HISTORY  02/17/2020    Cardiac cath His ablation    OTHER SURGICAL HISTORY  02/17/2020    Surgery   [3]   Social History  Tobacco Use    Smoking status: Never    Smokeless tobacco: Never   Substance Use Topics    Alcohol use: Not Currently    Drug use: Not Currently   [4]   Family History  Problem Relation Name Age of Onset    Hypertension Mother      Other (cardiac disorder) Mother      Heart failure Mother      Other (Other) Father     [5]   Allergies  Allergen Reactions    Red Dye Shortness of breath     Other Reaction(s): breathing    Aspartame Headache, Unknown and Other     Other Reaction(s): Intolerance    Cetirizine Unknown and Other     Other Reaction(s): Intolerance      \"makes heart race\"    \"makes heart race\"   Other Reaction(s): Intolerance      \"makes heart race\"    \"makes heart race\"    Gluten Protein Unknown and Other     Other Reaction(s): Intolerance    Milk Containing Products (Dairy) Unknown    Tetracyclines Unknown    Acetaminophen Rash and Unknown     rash    Cranberry Rash    " Gadolinium-Containing Contrast Media Rash   [6]   Patient Active Problem List  Diagnosis    Lupus erythematosus    Paroxysmal supraventricular tachycardia    Implantable loop recorder present    Palpitations    BMI 28.0-28.9,adult    Never smoked tobacco

## 2025-08-15 ENCOUNTER — HOSPITAL ENCOUNTER (OUTPATIENT)
Dept: CARDIOLOGY | Facility: HOSPITAL | Age: 60
Discharge: HOME | End: 2025-08-15
Payer: COMMERCIAL

## 2025-08-15 DIAGNOSIS — I47.10 PAROXYSMAL SUPRAVENTRICULAR TACHYCARDIA: ICD-10-CM

## 2025-08-15 PROCEDURE — 93298 REM INTERROG DEV EVAL SCRMS: CPT

## 2025-08-15 PROCEDURE — 93298 REM INTERROG DEV EVAL SCRMS: CPT | Performed by: INTERNAL MEDICINE

## 2026-01-14 ENCOUNTER — APPOINTMENT (OUTPATIENT)
Dept: CARDIOLOGY | Facility: CLINIC | Age: 61
End: 2026-01-14
Payer: COMMERCIAL